# Patient Record
Sex: MALE | Race: BLACK OR AFRICAN AMERICAN | NOT HISPANIC OR LATINO | Employment: FULL TIME | URBAN - METROPOLITAN AREA
[De-identification: names, ages, dates, MRNs, and addresses within clinical notes are randomized per-mention and may not be internally consistent; named-entity substitution may affect disease eponyms.]

---

## 2020-07-14 ENCOUNTER — OFFICE VISIT (OUTPATIENT)
Dept: FAMILY MEDICINE CLINIC | Facility: CLINIC | Age: 35
End: 2020-07-14

## 2020-07-14 VITALS
TEMPERATURE: 97.3 F | OXYGEN SATURATION: 97 % | SYSTOLIC BLOOD PRESSURE: 110 MMHG | HEIGHT: 70 IN | RESPIRATION RATE: 16 BRPM | WEIGHT: 183 LBS | HEART RATE: 88 BPM | BODY MASS INDEX: 26.2 KG/M2 | DIASTOLIC BLOOD PRESSURE: 70 MMHG

## 2020-07-14 DIAGNOSIS — Z00.00 ROUTINE GENERAL MEDICAL EXAMINATION AT A HEALTH CARE FACILITY: Primary | ICD-10-CM

## 2020-07-14 PROCEDURE — 81002 URINALYSIS NONAUTO W/O SCOPE: CPT | Performed by: NURSE PRACTITIONER

## 2020-07-14 PROCEDURE — 99499 UNLISTED E&M SERVICE: CPT | Performed by: NURSE PRACTITIONER

## 2020-07-14 NOTE — ASSESSMENT & PLAN NOTE
Patient will return 07/15/2020 to provide urine for evaluation for protein sugar  Physical pending until completed

## 2020-07-14 NOTE — PATIENT INSTRUCTIONS
Medical Examiner's Certificate    I certify that I have examined Karin Sullivan  In accordance with the [employer] (67   37) and with knowledge of the driving duties, I find this person is qualified; and, if applicable, only when:      Wearing corrective lenses  The information I have provided regarding this physical examination is true and complete   A complete examination form with any attachment embodies my findings completely and correctly, and is on file in my office       _________________________________________  MARQUEZ Marie  7/14/2020    __________________________________________ _______________________ _____   Signature of  s License No  State       Address of   [address] Date  7/14/2022

## 2020-07-14 NOTE — PROGRESS NOTES
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       The following portions of the patient's history were reviewed and updated as appropriate:   He has no past medical history on file  ,  does not have any pertinent problems on file  ,   has no past surgical history on file ,  Family history is unknown by patient  ,   reports that he has been smoking cigarettes  He has a 5 00 pack-year smoking history  He has never used smokeless tobacco  He reports that he does not drink alcohol or use drugs  ,  has No Known Allergies     No current outpatient medications on file  No current facility-administered medications for this visit  Review of Systems   Constitutional: Negative for appetite change, chills, fatigue and fever  HENT: Negative for congestion, ear pain, postnasal drip, rhinorrhea, sinus pain and sore throat  Eyes: Negative for pain, redness and visual disturbance  Respiratory: Negative for cough and shortness of breath  Cardiovascular: Negative for chest pain  Gastrointestinal: Negative for abdominal pain, diarrhea, nausea and vomiting  Genitourinary: Negative for dysuria and hematuria  Musculoskeletal: Negative for arthralgias  Skin: Negative  Allergic/Immunologic: Negative for environmental allergies and food allergies  Neurological: Negative for dizziness, weakness, light-headedness, numbness and headaches           Objective:  Vitals:    07/14/20 1512   BP: 110/70   BP Location: Left arm   Patient Position: Sitting   Cuff Size: Standard   Pulse: 88   Resp: 16   Temp: (!) 97 3 °F (36 3 °C)   TempSrc: Temporal   SpO2: 97%   Weight: 83 kg (183 lb)   Height: 5' 10" (1 778 m)     Body mass index is 26 26 kg/m²  Physical Exam   Constitutional: He is oriented to person, place, and time  He appears well-developed and well-nourished  HENT:   Head: Normocephalic and atraumatic  Eyes: Pupils are equal, round, and reactive to light  Neck: Normal range of motion  Cardiovascular: Normal rate and regular rhythm  No murmur heard  Pulmonary/Chest: Effort normal and breath sounds normal    Abdominal: Soft  Bowel sounds are normal    Musculoskeletal: Normal range of motion  Neurological: He is alert and oriented to person, place, and time  Skin: Skin is warm  Psychiatric: He has a normal mood and affect  Nursing note and vitals reviewed

## 2020-07-15 LAB
SL AMB  POCT GLUCOSE, UA: NORMAL
SL AMB LEUKOCYTE ESTERASE,UA: NORMAL
SL AMB POCT BILIRUBIN,UA: NORMAL
SL AMB POCT BLOOD,UA: NORMAL
SL AMB POCT CLARITY,UA: CLEAR
SL AMB POCT COLOR,UA: NORMAL
SL AMB POCT KETONES,UA: NORMAL
SL AMB POCT NITRITE,UA: NORMAL
SL AMB POCT PH,UA: 5
SL AMB POCT SPECIFIC GRAVITY,UA: 1.02
SL AMB POCT URINE PROTEIN: NORMAL
SL AMB POCT UROBILINOGEN: NORMAL

## 2021-09-14 ENCOUNTER — HOSPITAL ENCOUNTER (EMERGENCY)
Facility: HOSPITAL | Age: 36
Discharge: HOME | End: 2021-09-14
Payer: COMMERCIAL

## 2021-09-14 VITALS
RESPIRATION RATE: 20 BRPM | OXYGEN SATURATION: 100 % | DIASTOLIC BLOOD PRESSURE: 108 MMHG | BODY MASS INDEX: 27.49 KG/M2 | HEIGHT: 70 IN | SYSTOLIC BLOOD PRESSURE: 134 MMHG | HEART RATE: 101 BPM | WEIGHT: 192 LBS | TEMPERATURE: 98.5 F

## 2021-09-14 DIAGNOSIS — L30.9 ECZEMA, UNSPECIFIED TYPE: Primary | ICD-10-CM

## 2021-09-14 PROCEDURE — 99284 EMERGENCY DEPT VISIT MOD MDM: CPT

## 2021-09-14 PROCEDURE — 99282 EMERGENCY DEPT VISIT SF MDM: CPT

## 2021-09-14 RX ORDER — PETROLATUM 0.61 G/G
CREAM TOPICAL AS NEEDED
Qty: 397 G | Refills: 2 | Status: SHIPPED | OUTPATIENT
Start: 2021-09-14

## 2021-09-14 RX ORDER — PREDNISONE 10 MG/1
10 TABLET ORAL DAILY
Qty: 65 TABLET | Refills: 0 | Status: SHIPPED | OUTPATIENT
Start: 2021-09-14

## 2021-09-14 NOTE — ED PROVIDER NOTES
History  Chief Complaint   Patient presents with    Rash     Ezcema flare up, worse than its ever been  Treid OTC with no relief  Rash mostly on back  43-year-old male history of borderline hypertension and eczema no other significant past medical history presents secondary to 3 weeks of increasing dry itchy rash diffusely mostly trunk back abdomen upper legs arms  Patient states he has been using over-the-counter lotion without any improvement  Patient denies any systemic symptoms no fever no chills patient denies any other complaints  Patient does not currently have a doctor  None       History reviewed  No pertinent past medical history  History reviewed  No pertinent surgical history  Family History   Family history unknown: Yes     I have reviewed and agree with the history as documented  E-Cigarette/Vaping     E-Cigarette/Vaping Substances     Social History     Tobacco Use    Smoking status: Current Every Day Smoker     Packs/day: 0 25     Years: 10 00     Pack years: 2 50     Types: Cigarettes    Smokeless tobacco: Never Used   Substance Use Topics    Alcohol use: Yes     Comment: occationally    Drug use: Never       Review of Systems   Constitutional: Negative for chills and fever  HENT: Negative for congestion  Eyes: Negative for visual disturbance  Respiratory: Negative for shortness of breath  Cardiovascular: Negative for chest pain  Gastrointestinal: Negative for abdominal pain  Endocrine: Negative for cold intolerance  Genitourinary: Negative for frequency  Musculoskeletal: Negative for gait problem  Skin: Positive for rash  Neurological: Negative for dizziness  Psychiatric/Behavioral: Negative for behavioral problems and confusion  Physical Exam  Physical Exam  Vitals and nursing note reviewed  Constitutional:       Appearance: He is well-developed  HENT:      Head: Normocephalic and atraumatic     Eyes:      Conjunctiva/sclera: Conjunctivae normal       Pupils: Pupils are equal, round, and reactive to light  Cardiovascular:      Rate and Rhythm: Normal rate and regular rhythm  Heart sounds: Normal heart sounds  Pulmonary:      Effort: Pulmonary effort is normal       Breath sounds: Normal breath sounds  Abdominal:      General: Bowel sounds are normal       Palpations: Abdomen is soft  Musculoskeletal:         General: Normal range of motion  Cervical back: Normal range of motion and neck supple  Skin:     General: Skin is warm and dry  Capillary Refill: Capillary refill takes less than 2 seconds  Comments: Patient has a what appears been extensive dry papular rash noted mostly on anterior chest back buttock upper leg arm region consistent with eczema   Neurological:      Mental Status: He is alert and oriented to person, place, and time  Psychiatric:         Behavior: Behavior normal          Vital Signs  ED Triage Vitals   Temperature Pulse Respirations Blood Pressure SpO2   09/14/21 1823 09/14/21 1819 09/14/21 1819 09/14/21 1819 09/14/21 1819   98 5 °F (36 9 °C) 101 20 (!) 134/108 100 %      Temp Source Heart Rate Source Patient Position - Orthostatic VS BP Location FiO2 (%)   09/14/21 1823 09/14/21 1819 09/14/21 1819 09/14/21 1819 --   Oral Monitor Sitting Left arm       Pain Score       --                  Vitals:    09/14/21 1819   BP: (!) 134/108   Pulse: 101   Patient Position - Orthostatic VS: Sitting         Visual Acuity      ED Medications  Medications - No data to display    Diagnostic Studies  Results Reviewed     None                 No orders to display              Procedures  Procedures         ED Course                             SBIRT 22yo+      Most Recent Value   SBIRT (24 yo +)   In order to provide better care to our patients, we are screening all of our patients for alcohol and drug use  Would it be okay to ask you these screening questions?   No Filed at: 09/14/2021 1823 [facility]  Number of Diagnoses or Management Options  Diagnosis management comments: Impression is acute eczema patient be placed on a prednisone taper 60 mg to start tapering by 10 mg every 3 days until off patient also be placed on topical lotion he will be given referral for [facility] internal medicine follow-up with within the next 2-3 weeks for re-evaluation  Disposition  Final diagnoses:   Eczema, unspecified type     Time reflects when diagnosis was documented in both MDM as applicable and the Disposition within this note     Time User Action Codes Description Comment    9/14/2021  6:32 PM Vandana Cooper Add [L30 9] Eczema, unspecified type       ED Disposition     ED Disposition Condition Date/Time Comment    Discharge Stable Tue Sep 14, 2021  6:32 PM Katie Denis discharge to home/self care  Follow-up Information     Follow up With Specialties Details Why Contact Info Additional Information    [facility] Schedule an appointment as soon as possible for a visit in 1 week  [address]  [place] [address] [address] Internal Medicine [facility], [address], [place], [place], [id number] [phone]          Patient's Medications   Discharge Prescriptions    PREDNISONE 10 MG TABLET    Take 1 tablet (10 mg total) by mouth daily 6 tabs a day for 3 days, then 5 tabs a day for 3 days, then 4 tabs a day for 3 days, then 3 tabs a day for 3 days then 2 tabs a day for 3 days then 1 tab a day for 3 days then 1/2 tab a day for 3 then stop       Start Date: 9/14/2021 End Date: --       Order Dose: 10 mg       Quantity: 65 tablet    Refills: 0    SKIN PROTECTANTS, MISC  (EUCERIN) CREAM    Apply topically as needed for wound care Apply to skin daily as needed       Start Date: 9/14/2021 End Date: --       Order Dose: --       Quantity: 397 g    Refills: 2     No discharge procedures on file      PDMP Review     None ED Provider  Electronically Signed by           Gracie Granger MD  09/14/21 7779

## 2022-12-06 ENCOUNTER — HOSPITAL ENCOUNTER (EMERGENCY)
Facility: HOSPITAL | Age: 37
Discharge: HOME | End: 2022-12-06
Attending: EMERGENCY MEDICINE

## 2022-12-06 VITALS
BODY MASS INDEX: 27.49 KG/M2 | TEMPERATURE: 97.9 F | HEART RATE: 93 BPM | RESPIRATION RATE: 16 BRPM | OXYGEN SATURATION: 98 % | WEIGHT: 192 LBS | DIASTOLIC BLOOD PRESSURE: 79 MMHG | HEIGHT: 70 IN | SYSTOLIC BLOOD PRESSURE: 135 MMHG

## 2022-12-06 DIAGNOSIS — Z20.2 STD EXPOSURE: Primary | ICD-10-CM

## 2022-12-06 LAB
BILIRUB UR QL STRIP: NEGATIVE
CLARITY UR: CLEAR
COLOR UR: YELLOW
GLUCOSE UR STRIP-MCNC: NEGATIVE MG/DL
HGB UR QL STRIP.AUTO: NEGATIVE
KETONES UR STRIP-MCNC: NEGATIVE MG/DL
LEUKOCYTE ESTERASE UR QL STRIP: NEGATIVE
NITRITE UR QL STRIP: NEGATIVE
PH UR STRIP.AUTO: 6 [PH]
PROT UR STRIP-MCNC: NEGATIVE MG/DL
SP GR UR STRIP.AUTO: >=1.03 (ref 1–1.03)
UROBILINOGEN UR QL STRIP.AUTO: 1 E.U./DL

## 2022-12-06 RX ORDER — DOXYCYCLINE HYCLATE 100 MG/1
100 CAPSULE ORAL 2 TIMES DAILY
Qty: 14 CAPSULE | Refills: 0 | Status: SHIPPED | OUTPATIENT
Start: 2022-12-06 | End: 2022-12-13

## 2022-12-06 RX ORDER — METRONIDAZOLE 500 MG/1
500 TABLET ORAL ONCE
Status: COMPLETED | OUTPATIENT
Start: 2022-12-06 | End: 2022-12-06

## 2022-12-06 RX ORDER — METRONIDAZOLE 500 MG/1
500 TABLET ORAL EVERY 12 HOURS SCHEDULED
Qty: 14 TABLET | Refills: 0 | Status: SHIPPED | OUTPATIENT
Start: 2022-12-06 | End: 2022-12-13

## 2022-12-06 RX ORDER — DOXYCYCLINE HYCLATE 100 MG/1
100 CAPSULE ORAL ONCE
Status: COMPLETED | OUTPATIENT
Start: 2022-12-06 | End: 2022-12-06

## 2022-12-06 RX ADMIN — METRONIDAZOLE 500 MG: 500 TABLET ORAL at 17:02

## 2022-12-06 RX ADMIN — DOXYCYCLINE 100 MG: 100 CAPSULE ORAL at 17:02

## 2022-12-06 RX ADMIN — LIDOCAINE HYDROCHLORIDE 500 MG: 10 INJECTION, SOLUTION EPIDURAL; INFILTRATION; INTRACAUDAL; PERINEURAL at 17:03

## 2022-12-06 NOTE — ED PROVIDER NOTES
History  Chief Complaint   Patient presents with   • Exposure to STD     Pt states one of his partners told him to get checked because she has trich  Pt denies any s/s     28-year-old male presents because he was notified that his girlfriend tested positive for trichomonas  The patient reports he has no symptoms including no polyuria, dysuria, hematuria, and no constipation or diarrhea, abdominal pain, flank pain, nausea, vomiting, fever, headache, dizziness, rash aside from his baseline eczema, no other complaints  The patient has no medical problems, takes no medications, has no allergies, no surgeries  Prior to Admission Medications   Prescriptions Last Dose Informant Patient Reported? Taking? Skin Protectants, Misc  (eucerin) cream   No No   Sig: Apply topically as needed for wound care Apply to skin daily as needed   predniSONE 10 mg tablet   No No   Sig: Take 1 tablet (10 mg total) by mouth daily 6 tabs a day for 3 days, then 5 tabs a day for 3 days, then 4 tabs a day for 3 days, then 3 tabs a day for 3 days then 2 tabs a day for 3 days then 1 tab a day for 3 days then 1/2 tab a day for 3 then stop      Facility-Administered Medications: None       History reviewed  No pertinent past medical history  History reviewed  No pertinent surgical history  Family History   Family history unknown: Yes     I have reviewed and agree with the history as documented  E-Cigarette/Vaping     E-Cigarette/Vaping Substances     Social History     Tobacco Use   • Smoking status: Every Day     Packs/day: 0 25     Years: 10 00     Pack years: 2 50     Types: Cigarettes   • Smokeless tobacco: Never   Substance Use Topics   • Alcohol use: Yes     Comment: occationally   • Drug use: Never       Review of Systems   Constitutional: Negative for fever  HENT: Negative for congestion  Eyes: Negative for visual disturbance  Respiratory: Negative for cough and shortness of breath      Cardiovascular: Negative for chest pain  Gastrointestinal: Negative for abdominal pain, diarrhea and vomiting  Endocrine: Negative for polyuria  Genitourinary: Negative for dysuria and hematuria  Musculoskeletal: Negative for myalgias  Neurological: Negative for dizziness and headaches  Physical Exam  Physical Exam  Vitals and nursing note reviewed  Constitutional:       General: He is not in acute distress  Appearance: Normal appearance  HENT:      Head: Normocephalic and atraumatic  Right Ear: External ear normal       Left Ear: External ear normal       Mouth/Throat:      Mouth: Mucous membranes are moist       Pharynx: Oropharynx is clear  Eyes:      Conjunctiva/sclera: Conjunctivae normal       Pupils: Pupils are equal, round, and reactive to light  Cardiovascular:      Rate and Rhythm: Normal rate  Pulmonary:      Effort: Pulmonary effort is normal  No respiratory distress  Abdominal:      General: Abdomen is flat  There is no distension  Musculoskeletal:         General: No deformity  Normal range of motion  Cervical back: Normal range of motion  Skin:     General: Skin is warm and dry  Neurological:      General: No focal deficit present  Mental Status: He is alert and oriented to person, place, and time     Psychiatric:         Mood and Affect: Mood normal          Behavior: Behavior normal          Vital Signs  ED Triage Vitals [12/06/22 1626]   Temperature Pulse Respirations Blood Pressure SpO2   97 9 °F (36 6 °C) 93 16 135/79 98 %      Temp Source Heart Rate Source Patient Position - Orthostatic VS BP Location FiO2 (%)   Oral -- Lying Right arm --      Pain Score       No Pain           Vitals:    12/06/22 1626   BP: 135/79   Pulse: 93   Patient Position - Orthostatic VS: Lying         Visual Acuity      ED Medications  Medications   cefTRIAXone (ROCEPHIN) 500 mg in lidocaine (PF) (XYLOCAINE-MPF) 1 % IM only syringe (500 mg Intramuscular Given 12/6/22 1703)   doxycycline hyclate (VIBRAMYCIN) capsule 100 mg (100 mg Oral Given 12/6/22 1702)   metroNIDAZOLE (FLAGYL) tablet 500 mg (500 mg Oral Given 12/6/22 1702)       Diagnostic Studies  Results Reviewed     Procedure Component Value Units Date/Time    UA (URINE) with reflex to Scope [[id number]]  (Normal) Collected: 12/06/22 1655    Lab Status: Final result Specimen: Urine, Clean Catch Updated: 12/06/22 1705     Color, UA Yellow     Clarity, UA Clear     Specific Gravity, UA >=1 030     pH, UA 6 0     Leukocytes, UA Negative     Nitrite, UA Negative     Protein, UA Negative mg/dl      Glucose, UA Negative mg/dl      Ketones, UA Negative mg/dl      Urobilinogen, UA 1 0 E U /dl      Bilirubin, UA Negative     Occult Blood, UA Negative    Chlamydia/GC amplified DNA by PCR [[id number]] Collected: 12/06/22 1655    Lab Status: In process Specimen: Urine, Other Updated: 12/06/22 1659    Trichomonas vaginalis/Mycoplasma genitalium PCR [[id number]] Collected: 12/06/22 1655    Lab Status: In process Specimen: Urine, Voided Updated: 12/06/22 1659                 No orders to display              Procedures  Procedures         ED Course       MDM  Number of Diagnoses or Management Options  Diagnosis management comments: The patient will be treated empirically for gonorrhea, chlamydia, and trichomonas, test will be obtained and the patient will be discharged with antibiotics, he will be called with the results and if necessary the antibiotics will be discontinued at that time  The patient is comfortable with treatment plan and discharge with follow-up        Disposition  Final diagnoses:   STD exposure     Time reflects when diagnosis was documented in both MDM as applicable and the Disposition within this note     Time User Action Codes Description Comment    12/6/2022  5:15 PM Nohelia Kruse Add [Z20 2] STD exposure       ED Disposition     ED Disposition   Discharge    Condition   Stable    Date/Time   Tue Dec 6, 2022  5:15 PM    Jessy Crump Angélica discharge to home/self care  Follow-up Information     Follow up With Specialties Details Why Contact Info Additional [address]  Emergency Department Emergency Medicine Go to  As needed [address] [address]  [address] Emergency Department, [address], [address]    [facility] Schedule an appointment as soon as possible for a visit in 3 days For follow-up [address]  [address]  [phone] [facility], [address], [place], [place], [address] [phone]          Discharge Medication List as of 12/6/2022  5:17 PM      START taking these medications    Details   doxycycline hyclate (VIBRAMYCIN) 100 mg capsule Take 1 capsule (100 mg total) by mouth 2 (two) times a day for 7 days, Starting Tue 12/6/2022, Until Tue 12/13/2022, Normal      metroNIDAZOLE (FLAGYL) 500 mg tablet Take 1 tablet (500 mg total) by mouth every 12 (twelve) hours for 7 days, Starting Tue 12/6/2022, Until Tue 12/13/2022, Normal         CONTINUE these medications which have NOT CHANGED    Details   predniSONE 10 mg tablet Take 1 tablet (10 mg total) by mouth daily 6 tabs a day for 3 days, then 5 tabs a day for 3 days, then 4 tabs a day for 3 days, then 3 tabs a day for 3 days then 2 tabs a day for 3 days then 1 tab a day for 3 days then 1/2 tab a day for 3 then stop, Star ting Tue 9/14/2021, Normal      Skin Protectants, Misc  (eucerin) cream Apply topically as needed for wound care Apply to skin daily as needed, Starting Tue 9/14/2021, Normal             No discharge procedures on file      PDMP Review     None          ED Provider  Electronically Signed by           George Juarez MD  12/06/22 5630

## 2022-12-07 DIAGNOSIS — A49.3 INFECTION DUE TO MYCOPLASMA GENITALIUM: Primary | ICD-10-CM

## 2022-12-07 LAB
C TRACH DNA SPEC QL NAA+PROBE: NEGATIVE
M GENITALIUM DNA SPEC QL NAA+PROBE: POSITIVE
N GONORRHOEA DNA SPEC QL NAA+PROBE: NEGATIVE
T VAGINALIS DNA SPEC QL NAA+PROBE: NEGATIVE

## 2022-12-07 RX ORDER — MOXIFLOXACIN HYDROCHLORIDE 400 MG/1
400 TABLET ORAL DAILY
Qty: 7 TABLET | Refills: 0 | Status: SHIPPED | OUTPATIENT
Start: 2022-12-07 | End: 2022-12-14

## 2023-03-08 ENCOUNTER — HOSPITAL ENCOUNTER (EMERGENCY)
Facility: HOSPITAL | Age: 38
Discharge: HOME | End: 2023-03-08
Attending: EMERGENCY MEDICINE

## 2023-03-08 VITALS
TEMPERATURE: 98.7 F | SYSTOLIC BLOOD PRESSURE: 135 MMHG | RESPIRATION RATE: 18 BRPM | DIASTOLIC BLOOD PRESSURE: 100 MMHG | OXYGEN SATURATION: 100 % | HEART RATE: 77 BPM

## 2023-03-08 DIAGNOSIS — Z76.89 ENCOUNTER FOR ASSESSMENT OF STD EXPOSURE: Primary | ICD-10-CM

## 2023-03-08 LAB
BILIRUB UR QL STRIP: NEGATIVE
CLARITY UR: CLEAR
COLOR UR: YELLOW
GLUCOSE UR STRIP-MCNC: NEGATIVE MG/DL
HGB UR QL STRIP.AUTO: NEGATIVE
KETONES UR STRIP-MCNC: NEGATIVE MG/DL
LEUKOCYTE ESTERASE UR QL STRIP: NEGATIVE
NITRITE UR QL STRIP: NEGATIVE
PH UR STRIP.AUTO: 6.5 [PH]
PROT UR STRIP-MCNC: NEGATIVE MG/DL
SP GR UR STRIP.AUTO: 1.02 (ref 1–1.03)
UROBILINOGEN UR QL STRIP.AUTO: 1 E.U./DL

## 2023-03-08 NOTE — DISCHARGE INSTRUCTIONS
Please return to the emergency department for worsening symptoms including chest pain, shortness of breath, dizziness, lightheadedness, fever greater than 103, severe pain, inability to walk, fainting episodes, etc  Please follow-up with your family practice provider as soon as possible  We will call you with results of STD testing  If you test positive for gonorrhea, you will need to return to the emergency department for an injection  Please abstain from sexual activity until results are received  You may follow-up with an STD clinic for further STD testing

## 2023-03-09 LAB
C TRACH DNA SPEC QL NAA+PROBE: NEGATIVE
M GENITALIUM DNA SPEC QL NAA+PROBE: NEGATIVE
N GONORRHOEA DNA SPEC QL NAA+PROBE: NEGATIVE
T VAGINALIS DNA SPEC QL NAA+PROBE: NEGATIVE

## 2023-03-09 NOTE — ED PROVIDER NOTES
History  Chief Complaint   Patient presents with   • Exposure to STD     Pt presents after being tested for STD in December 2022, "pt here for revaluation/follow-up" pt asymptomatic  This is a 49-year-old male present to the emergency department today for STD testing  The patient was here in December 2022 for STD testing and tested positive for mycoplasma genitalium  On chart review, he was treated with moxifloxacin; notes he is currently asymptomatic  He denies any penile discharge, testicular pain, dysuria, hematuria, genital rashes, and genital lesions  The patient does note he has had unprotected sex with a partner who may have a sexually transmitted infection  He is requesting to be retested  He denies any chest pain or shortness of breath  He denies any fevers or chills  He has no abdominal pain or sore throat  The patient denies other complaints at this time  History provided by:  Patient   used: No    Exposure to STD  Severity:  Mild  Associated symptoms: no abdominal pain, no chest pain, no congestion, no cough, no diarrhea, no ear pain, no fatigue, no fever, no headaches, no loss of consciousness, no myalgias, no nausea, no rash, no rhinorrhea, no shortness of breath, no sore throat, no vomiting and no wheezing        Prior to Admission Medications   Prescriptions Last Dose Informant Patient Reported? Taking? Skin Protectants, Misc  (eucerin) cream   No No   Sig: Apply topically as needed for wound care Apply to skin daily as needed   predniSONE 10 mg tablet   No No   Sig: Take 1 tablet (10 mg total) by mouth daily 6 tabs a day for 3 days, then 5 tabs a day for 3 days, then 4 tabs a day for 3 days, then 3 tabs a day for 3 days then 2 tabs a day for 3 days then 1 tab a day for 3 days then 1/2 tab a day for 3 then stop      Facility-Administered Medications: None       History reviewed  No pertinent past medical history  History reviewed   No pertinent surgical history  Family History   Family history unknown: Yes     I have reviewed and agree with the history as documented  E-Cigarette/Vaping     E-Cigarette/Vaping Substances     Social History     Tobacco Use   • Smoking status: Every Day     Packs/day: 0 25     Years: 10 00     Pack years: 2 50     Types: Cigarettes   • Smokeless tobacco: Never   Substance Use Topics   • Alcohol use: Yes     Comment: occationally   • Drug use: Never       Review of Systems   Constitutional: Negative for appetite change, chills, diaphoresis, fatigue and fever  HENT: Negative for congestion, ear pain, rhinorrhea and sore throat  Respiratory: Negative for cough, chest tightness, shortness of breath and wheezing  Cardiovascular: Negative for chest pain, palpitations and leg swelling  Gastrointestinal: Negative for abdominal pain, constipation, diarrhea, nausea and vomiting  Genitourinary: Negative for decreased urine volume, dysuria, flank pain, frequency, genital sores, penile discharge, penile pain and testicular pain  Musculoskeletal: Negative for myalgias, neck pain and neck stiffness  Skin: Negative for rash and wound  Neurological: Negative for dizziness, seizures, loss of consciousness, syncope, weakness, light-headedness, numbness and headaches  Psychiatric/Behavioral: Negative for confusion  All other systems reviewed and are negative  Physical Exam  Physical Exam  Vitals and nursing note reviewed  Constitutional:       General: He is not in acute distress  Appearance: Normal appearance  He is normal weight  He is not ill-appearing, toxic-appearing or diaphoretic  HENT:      Head: Normocephalic and atraumatic  Nose: Nose normal  No congestion or rhinorrhea  Mouth/Throat:      Mouth: Mucous membranes are moist       Pharynx: No oropharyngeal exudate or posterior oropharyngeal erythema  Eyes:      General: No scleral icterus  Right eye: No discharge           Left eye: No discharge  Conjunctiva/sclera: Conjunctivae normal    Cardiovascular:      Rate and Rhythm: Normal rate and regular rhythm  Pulses: Normal pulses  Heart sounds: Normal heart sounds  No murmur heard  No friction rub  No gallop  Pulmonary:      Effort: Pulmonary effort is normal  No respiratory distress  Breath sounds: Normal breath sounds  No stridor  No wheezing, rhonchi or rales  Chest:      Chest wall: No tenderness  Abdominal:      General: Abdomen is flat  There is no distension  Palpations: Abdomen is soft  Tenderness: There is no abdominal tenderness  There is no right CVA tenderness, left CVA tenderness, guarding or rebound  Genitourinary:     Comments: Deferred by patient  Musculoskeletal:         General: Normal range of motion  Cervical back: Normal range of motion  No rigidity  Right lower leg: No edema  Left lower leg: No edema  Skin:     General: Skin is warm and dry  Capillary Refill: Capillary refill takes less than 2 seconds  Coloration: Skin is not jaundiced or pale  Neurological:      General: No focal deficit present  Mental Status: He is alert and oriented to person, place, and time  Mental status is at baseline     Psychiatric:         Mood and Affect: Mood normal          Behavior: Behavior normal          Vital Signs  ED Triage Vitals   Temperature Pulse Respirations Blood Pressure SpO2   03/08/23 1750 03/08/23 1658 03/08/23 1658 03/08/23 1700 03/08/23 1658   98 7 °F (37 1 °C) 77 18 135/100 100 %      Temp Source Heart Rate Source Patient Position - Orthostatic VS BP Location FiO2 (%)   03/08/23 1750 03/08/23 1658 03/08/23 1658 03/08/23 1658 --   Oral Monitor Sitting Left arm       Pain Score       --                  Vitals:    03/08/23 1658 03/08/23 1700   BP:  135/100   Pulse: 77    Patient Position - Orthostatic VS: Sitting Sitting         Visual Acuity      ED Medications  Medications - No data to display    Diagnostic Studies  Results Reviewed     Procedure Component Value Units Date/Time    Chlamydia/GC amplified DNA by PCR [[id number]]  (Normal) Collected: 03/08/23 2715    Lab Status: Final result Specimen: Urine, Other Updated: 03/09/23 0436     N gonorrhoeae, DNA Probe Negative     Chlamydia trachomatis, DNA Probe Negative    Narrative: This test was performed using the FDA-approved Bakari 6800 CT/NG assay ([employer])  This test uses real-time PCR to detect Chlamydia trachomatis (CT) and Neisseria gonorrhoeae (NG)  This instrument and assay have been validated by the  and performing laboratory and verified by the performing laboratory  This test is intended as an aid in the diagnosis of chlamydial and gonococcal disease  This test has not been evaluated in patients younger than 15years of age and is not recommended for evaluation of suspected sexual abuse  This assay is not intended to replace other exams or tests for diagnosis of urogenital infection by causative factors other than Chalmydia trachomatis (CT) and Neisseria gonorrhoeae (NG)  Additional testing is recommended when the results do not correlate with clinical signs and symptoms  Procedural Limitations  This assay has only been validated for use with male and female urine, clinician-instructed self-collected vaginal swab specimens, clinician-collected vaginal swab specimens, endocervical swab specimens collected in bakari® PCR Media and cervical specimens collected in PreservCyt® Solution  Assay performance has not been validated for use with other collection media and/or specimen types  Detection of C  trachomatis and Myrna Kendra is dependent on the number of organisms present in the specimen  Detection may be affected by specimen collection methods, patient factors, stage of infection, infecting strains, and presence of polymerase/PCR inhibitors     When CT is present at very high concentrations, the detection of NG present at concentrations near the limit of detection may be impacted  The presence of mucus in endocervical specimens may lead to false negative results  The presence of whole blood in urine and cervical specimens collected in PreservCyt Solution may lead to false negative and/or invalid test results  Urine testing is recommended to be performed on first catch urine samples  The effects of other collection variables have not been evaluated at this time  The effects of vaginal discharge, tampon use, douching, and other collection variables have not been evaluated at this time  This assay has not been evaluated with patients currently being treated with antimicrobial agents active against CT or NG, or patients with a history of hysterectomy  Trichomonas vaginalis/Mycoplasma genitalium PCR [[id number]]  (Normal) Collected: 03/08/23 7993    Lab Status: Final result Specimen: Urine, Voided Updated: 03/09/23 0433     Trichomonas vaginalis Negative     Mycoplasma genitalium Negative    Narrative: This test was performed using the FDA-approved Bakari 6800 TV/MG assay ([employer])  This test uses real-time PCR to detect Trichomonas vaginalis (TV) and Mycoplasma genitalium (MG) DNA, to assist in identification of suspected infections  This instrument and assay have been validated by the  and performing laboratory and verified by the performing laboratory  Clinically validated specimen sources include urine and swabs (endocervical/vaginal)  This test has not been evaluated in patients younger than 25years of age  Cervical specimens collected in PreservCyt® Solution are validated for the detection of Trichomonas vaginalis only  Note that vaginal swabs are considered to be the most sensitive specimen type for MG testing  Trichomonas vaginalis is a protozoan/amoebic infection that can be transmitted with sexual contact   Appropriate treatment of oneself and of sexual partners should be sought to avoid re-infection  Mycoplasma genitalium is an increasingly identified and treatable bacterial infection and may be asymptomatic  Long term complications may result from untreated infections  Sexual transmission is possible  Procedural Limitations  This assay has only been validated for use with male and female urine, clinician-instructed self-collected vaginal swab specimens, clinician-collected vaginal swab specimens, endocervical swab specimens collected in renetta® PCR Media  This assay also detects TV DNA in cervical specimens collected in PreservCyt® Solution  Assay performance has not been validated for use with other collection media and/or specimen types  Detection of Trichomonas vaginalis and/or Mycoplasma genitalium is dependent on the number of organisms present in the specimen  Detection may be affected by specimen collection methods, patient factors, stage of infection, infecting strains, and presence of PCR inhibitors  A negative result does not preclude the presence of infection as results depend on adequate specimen collection, absence of inhibitors, and sufficient DNA to be detected  All results should be interpreted in conjunction with other clinical and laboratory data  The presence of mucus in endocervical specimens may lead to false or invalid results  Urine testing is recommended to be performed on first catch urine samples  The effects of other collection variables have not been evaluated at this time  The effects of vaginal discharge, tampon use, douching, and other collection variables have not been evaluated at this time  This assay has not been evaluated with patients currently being treated with antimicrobial agents active against TV or MG, or patients with a history of hysterectomy         UA w Reflex to Microscopic w Reflex to Culture [[id number]]  (Normal) Collected: 03/08/23 3417    Lab Status: Final result Specimen: Urine, Clean Catch Updated: 03/08/23 9618 Color, UA Yellow     Clarity, UA Clear     Specific Gravity, UA 1 025     pH, UA 6 5     Leukocytes, UA Negative     Nitrite, UA Negative     Protein, UA Negative mg/dl      Glucose, UA Negative mg/dl      Ketones, UA Negative mg/dl      Urobilinogen, UA 1 0 E U /dl      Bilirubin, UA Negative     Occult Blood, UA Negative                 No orders to display              Procedures  Procedures         ED Course                               SBIRT 22yo+    Flowsheet Row Most Recent Value   SBIRT (25 yo +)    In order to provide better care to our patients, we are screening all of our patients for alcohol and drug use  Would it be okay to ask you these screening questions? No Filed at: 03/08/2023 1703                    Medical Decision Making  This is a 80-year-old male presenting to the emergency department today for STD testing  Tested positive for mycoplasma genitalium in December 2022  Requesting retest   Patient is asymptomatic  I reviewed prior charts and labs  Physical exam is reassuring  Urinalysis is negative while here in the emergency department  Gonorrhea, chlamydia, trichomonas, and mycoplasma genitalium sent for culture and urine  Patient defers Rocephin and doxycycline therapy at this time  The patient is stable for discharge at this time  Follow-up with STD testing center  Abstain from sexual activity until you receive negative STD results  Strict return precautions were given  Recommend PCP follow-up as soon as possible  The patient and/or patient's proxy verify their understanding and agree to the plan at this time  All questions answered to the patient and/or their proxy's satisfaction  All labs reviewed and utilized in the medical decision making process (if labs were ordered)    Portions of the record may have been created with voice recognition software   Occasional wrong word or "sound a like" substitutions may have occurred due to the inherent limitations of voice recognition software   Read the chart carefully and recognize, using context, where substitutions have occurred  Encounter for assessment of STD exposure: complicated acute illness or injury  Amount and/or Complexity of Data Reviewed  External Data Reviewed: labs and notes  Labs: ordered  Decision-making details documented in ED Course  Disposition  Final diagnoses:   Encounter for assessment of STD exposure     Time reflects when diagnosis was documented in both MDM as applicable and the Disposition within this note     Time User Action Codes Description Comment    3/8/2023  5:49 PM Wade Reza Archana [[id number]] Encounter for assessment of STD exposure       ED Disposition     ED Disposition   Discharge    Condition   Stable    Date/Time   Wed Mar 8, 2023 1749    [address] discharge to home/self care                 Follow-up Information     Follow up With Specialties Details Why Contact Info Additional [address] Emergency Department Emergency Medicine Go to  If symptoms worsen [address] [facility],[address] [address]  [address] [facility] Emergency Department, [address], [address] [place] [address]    [facility] Medicine Schedule an appointment as soon as possible for a visit   [address]  [place] [address]  [phone] [facility], [address], [place], [place], [id number] [phone]          Discharge Medication List as of 3/8/2023  5:50 PM      CONTINUE these medications which have NOT CHANGED    Details   predniSONE 10 mg tablet Take 1 tablet (10 mg total) by mouth daily 6 tabs a day for 3 days, then 5 tabs a day for 3 days, then 4 tabs a day for 3 days, then 3 tabs a day for 3 days then 2 tabs a day for 3 days then 1 tab a day for 3 days then 1/2 tab a day for 3 then stop, Star ting Tue 9/14/2021, Normal      Skin Protectants, Misc  (eucerin) cream Apply topically as needed for wound care Apply to skin daily as needed, Starting Tue 9/14/2021, Normal             No discharge procedures on file      PDMP Review     None          ED Provider  Electronically Signed by           Jamari Berman PA-C  03/09/23 6986

## 2023-08-25 ENCOUNTER — HOSPITAL ENCOUNTER (EMERGENCY)
Facility: HOSPITAL | Age: 38
Discharge: HOME | End: 2023-08-25
Attending: EMERGENCY MEDICINE
Payer: COMMERCIAL

## 2023-08-25 ENCOUNTER — APPOINTMENT (EMERGENCY)
Dept: CT IMAGING | Facility: HOSPITAL | Age: 38
End: 2023-08-25
Payer: COMMERCIAL

## 2023-08-25 ENCOUNTER — APPOINTMENT (EMERGENCY)
Dept: RADIOLOGY | Facility: HOSPITAL | Age: 38
End: 2023-08-25
Payer: COMMERCIAL

## 2023-08-25 VITALS
TEMPERATURE: 97.9 F | RESPIRATION RATE: 18 BRPM | SYSTOLIC BLOOD PRESSURE: 135 MMHG | BODY MASS INDEX: 27.09 KG/M2 | OXYGEN SATURATION: 97 % | WEIGHT: 200 LBS | DIASTOLIC BLOOD PRESSURE: 89 MMHG | HEIGHT: 72 IN | HEART RATE: 84 BPM
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
## 2023-08-25 DIAGNOSIS — S16.1XXA STRAIN OF NECK MUSCLE, INITIAL ENCOUNTER: ICD-10-CM

## 2023-08-25 DIAGNOSIS — S39.012A LUMBAR STRAIN, INITIAL ENCOUNTER: ICD-10-CM

## 2023-08-25 DIAGNOSIS — M62.838 MUSCLE SPASM: ICD-10-CM

## 2023-08-25 PROCEDURE — 99284 EMERGENCY DEPT VISIT MOD MDM: CPT

## 2023-08-25 PROCEDURE — 72100 X-RAY EXAM L-S SPINE 2/3 VWS: CPT

## 2023-08-25 PROCEDURE — G1004 CDSM NDSC: HCPCS

## 2023-08-25 PROCEDURE — 72125 CT NECK SPINE W/O DYE: CPT

## 2023-08-25 PROCEDURE — 99284 EMERGENCY DEPT VISIT MOD MDM: CPT | Performed by: PHYSICIAN ASSISTANT

## 2023-08-25 RX ORDER — IBUPROFEN 600 MG/1
600 TABLET ORAL ONCE
Status: COMPLETED | OUTPATIENT
Start: 2023-08-25 | End: 2023-08-25

## 2023-08-25 RX ORDER — IBUPROFEN 600 MG/1
600 TABLET ORAL EVERY 6 HOURS PRN
Qty: 20 TABLET | Refills: 0 | Status: SHIPPED | OUTPATIENT
Start: 2023-08-25

## 2023-08-25 RX ORDER — CYCLOBENZAPRINE HCL 10 MG
10 TABLET ORAL ONCE
Status: COMPLETED | OUTPATIENT
Start: 2023-08-25 | End: 2023-08-25

## 2023-08-25 RX ORDER — CYCLOBENZAPRINE HCL 10 MG
10 TABLET ORAL EVERY 8 HOURS PRN
Qty: 15 TABLET | Refills: 0 | Status: SHIPPED | OUTPATIENT
Start: 2023-08-25

## 2023-08-25 RX ADMIN — CYCLOBENZAPRINE 10 MG: 10 TABLET, FILM COATED ORAL at 10:41

## 2023-08-25 RX ADMIN — IBUPROFEN 600 MG: 600 TABLET, FILM COATED ORAL at 10:41

## 2023-08-25 NOTE — DISCHARGE INSTRUCTIONS
Use Tylenol every 4 hours or Ibuprofen every 6 hours; you can alternate the 2 medications taking something every 3 hours for pain, add Flexeril as needed for muscle spasm    If no improvement follow-up with your doctor in next few days.
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
## 2023-11-03 ENCOUNTER — APPOINTMENT (EMERGENCY)
Dept: CT IMAGING | Facility: HOSPITAL | Age: 38
End: 2023-11-03
Payer: COMMERCIAL

## 2023-11-03 ENCOUNTER — HOSPITAL ENCOUNTER (EMERGENCY)
Facility: HOSPITAL | Age: 38
Discharge: HOME | End: 2023-11-03
Attending: EMERGENCY MEDICINE
Payer: COMMERCIAL

## 2023-11-03 VITALS
SYSTOLIC BLOOD PRESSURE: 147 MMHG | OXYGEN SATURATION: 98 % | TEMPERATURE: 97.5 F | RESPIRATION RATE: 19 BRPM | HEART RATE: 89 BPM | DIASTOLIC BLOOD PRESSURE: 81 MMHG

## 2023-11-03 DIAGNOSIS — M54.2 NECK PAIN: ICD-10-CM

## 2023-11-03 DIAGNOSIS — R51.9 HEADACHE: ICD-10-CM
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
## 2023-11-03 PROCEDURE — G1004 CDSM NDSC: HCPCS

## 2023-11-03 PROCEDURE — 70450 CT HEAD/BRAIN W/O DYE: CPT

## 2023-11-03 PROCEDURE — 99284 EMERGENCY DEPT VISIT MOD MDM: CPT

## 2023-11-03 PROCEDURE — 72125 CT NECK SPINE W/O DYE: CPT

## 2023-11-03 PROCEDURE — 99284 EMERGENCY DEPT VISIT MOD MDM: CPT | Performed by: EMERGENCY MEDICINE

## 2023-11-03 RX ORDER — IBUPROFEN 600 MG/1
600 TABLET ORAL EVERY 6 HOURS PRN
Qty: 30 TABLET | Refills: 0 | Status: SHIPPED | OUTPATIENT
Start: 2023-11-03

## 2023-11-03 RX ORDER — ACETAMINOPHEN 325 MG/1
650 TABLET ORAL ONCE
Status: COMPLETED | OUTPATIENT
Start: 2023-11-03 | End: 2023-11-03

## 2023-11-03 RX ADMIN — ACETAMINOPHEN 650 MG: 325 TABLET, FILM COATED ORAL at 11:46
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
